# Patient Record
Sex: MALE | Race: OTHER | NOT HISPANIC OR LATINO
[De-identification: names, ages, dates, MRNs, and addresses within clinical notes are randomized per-mention and may not be internally consistent; named-entity substitution may affect disease eponyms.]

---

## 2021-04-13 PROBLEM — Z00.00 ENCOUNTER FOR PREVENTIVE HEALTH EXAMINATION: Status: ACTIVE | Noted: 2021-04-13

## 2021-04-15 ENCOUNTER — APPOINTMENT (OUTPATIENT)
Dept: SURGERY | Facility: CLINIC | Age: 61
End: 2021-04-15
Payer: MEDICAID

## 2021-04-15 VITALS
HEIGHT: 64 IN | BODY MASS INDEX: 29.53 KG/M2 | WEIGHT: 173 LBS | SYSTOLIC BLOOD PRESSURE: 106 MMHG | DIASTOLIC BLOOD PRESSURE: 59 MMHG | HEART RATE: 83 BPM

## 2021-04-15 DIAGNOSIS — Z56.0 UNEMPLOYMENT, UNSPECIFIED: ICD-10-CM

## 2021-04-15 DIAGNOSIS — Z78.9 OTHER SPECIFIED HEALTH STATUS: ICD-10-CM

## 2021-04-15 PROCEDURE — 99203 OFFICE O/P NEW LOW 30 MIN: CPT

## 2021-04-15 PROCEDURE — 99072 ADDL SUPL MATRL&STAF TM PHE: CPT

## 2021-04-15 RX ORDER — ASPIRIN 81 MG
81 TABLET, DELAYED RELEASE (ENTERIC COATED) ORAL
Refills: 0 | Status: ACTIVE | COMMUNITY

## 2021-04-15 SDOH — ECONOMIC STABILITY - INCOME SECURITY: UNEMPLOYMENT, UNSPECIFIED: Z56.0

## 2021-04-15 NOTE — PHYSICAL EXAM
[Alert] : alert [Oriented to Person] : oriented to person [Oriented to Place] : oriented to place [Oriented to Time] : oriented to time [Calm] : calm [JVD] : no jugular venous distention  [de-identified] : 5 cm thyroid right pole mass mobile, Firm, Smooth, non-Tender,   Well defined.  . No palpable lymph nodes.

## 2021-04-15 NOTE — DATA REVIEWED
[FreeTextEntry1] : TISH JOHN\par Date of Birth\par 1960\par Procedure\par CT NECK w/o contrast\par Study Date & Time\par 2021-03-25 12:06 PM\par Patient ID\par [id number]\par Accession Number\par [id number]\par Referring Physician\par MAXX SNYDER\par Institution Name\par [facility]\par Report\par RADIOLOGY REPORT\par FINDINGS\par FINDING\par Clinical indication: nontoxic goiter\par Technique: Multiple CT axial images were obtained without intravenous contrast from skull base to\par the thoracic inlet. Coronal and sagittal reformats were also obtained. This CT exam was performed\par using one or more of the following dose reduction techniques: Automated exposure control, adjustment\par of the mA and/or kV according to patient size, or use of iterative reconstruction technique.\par Comparison: None.\par Findings:\par Surface Markers: Right thyroid\par Skull Base / Nasopharynx: Unremarkable.\par Prevertebral / Retropharyngeal Space: Unremarkable.\par Floor of Mouth / Oral Cavity: Unremarkable.\par Tongue Base / Oropharynx / Hypopharynx: Unremarkable.\par Larynx: Unremarkable.\par Parotid / Submandibular Glands: Unremarkable.\par Lymph Nodes: No evidence for cervical lymphadenopathy by CT size criteria.\par Thyroid Gland: 4.7 x 4.5 x 5.6 cm. Mixed component predominantly low attenuation nodule in the\par right mid to lower pole abutting the isthmus. There is right to left tracheal shift without\par compression. There is no substernal extension.\par Paranasal Sinuses / Mastoids: Clear.\par Visualized Brain / Orbits: Unremarkable.\par Osseous Structures: Intact.\par  \par Visualized Lung Apices: No evidence for nodule or mass.\par IMPRESSION:\par 5.6 cm nodule of the right thyroid gland which is a candidate which may be a candidate for\par percutaneous biopsy/FNA. Diagnostic thyroid ultrasound is recommended for further classification.\par Electronically signed by: Nate Hammond MD 3/25/2021 1:37 PM\par Workstation: [id number]\par Electronically Signed By: Nate Hammond MD\par Sign Date: 25-MAR-21[facility]

## 2021-04-15 NOTE — PLAN
[FreeTextEntry1] : Mr. JOHN  was told significance of findings, options, risks and benefits were explained.    All surgical options were discussed including non-surgical treatments.  he was advised to obtain the results ogf the biopsy and consult Dr Acevedo. \par Patient advised to seek immediate medical attention with any acute change in symptoms or with the development of any new or worsening symptoms.  Patient's questions and concerns addressed to patient's satisfaction, and patient verbalized an understanding of the information discussed.\par \par

## 2021-04-15 NOTE — HISTORY OF PRESENT ILLNESS
[de-identified] : This is a 61 year  old patient who was referred by Dr. Martin Zamarripa with the chief complaint of having thyroid mass.  He reports having this condition for 6 years. He denies any trauma to the area.   He denies any fever or  night sweats. Appetite is good and weight is stable.  He had CT of the neck on 03/25/2021 that showed 4.7 x 4.5 x 5.6 cm. Mixed component predominantly low attenuation nodule in the right mid to lower pole abutting the isthmus. There is right to left tracheal shift without compression.  he denies difficulty breathing or swallowing.    He states  he had a Biopsy done in the clinic, he does not have the results. His PMD does not have the results.  He wants to know if it should be surgically  removed.\par \par

## 2021-04-15 NOTE — CONSULT LETTER
[Dear  ___] : Dear  [unfilled], [Consult Letter:] : I had the pleasure of evaluating your patient, [unfilled]. [Please see my note below.] : Please see my note below. [Consult Closing:] : Thank you very much for allowing me to participate in the care of this patient.  If you have any questions, please do not hesitate to contact me. [Sincerely,] : Sincerely, [FreeTextEntry3] : Douglas Ferguson MD, FACS

## 2021-04-20 ENCOUNTER — APPOINTMENT (OUTPATIENT)
Dept: SURGICAL ONCOLOGY | Facility: CLINIC | Age: 61
End: 2021-04-20
Payer: MEDICAID

## 2021-04-20 VITALS
WEIGHT: 173 LBS | TEMPERATURE: 98 F | SYSTOLIC BLOOD PRESSURE: 120 MMHG | HEIGHT: 64 IN | HEART RATE: 87 BPM | DIASTOLIC BLOOD PRESSURE: 72 MMHG | OXYGEN SATURATION: 98 % | BODY MASS INDEX: 29.53 KG/M2

## 2021-04-20 PROCEDURE — 99205 OFFICE O/P NEW HI 60 MIN: CPT

## 2021-04-20 PROCEDURE — 99072 ADDL SUPL MATRL&STAF TM PHE: CPT

## 2021-04-20 NOTE — PHYSICAL EXAM
[Normal Neck Lymph Nodes] : normal neck lymph nodes  [Normal Supraclavicular Lymph Nodes] : normal supraclavicular lymph nodes [Normal] : oriented to person, place and time, with appropriate affect [de-identified] : 5 cm mass right lobe of thyroid with tracheal deviaktion; no adenopathy

## 2021-04-20 NOTE — CONSULT LETTER
[Dear  ___] : Dear  [unfilled], [Consult Letter:] : I had the pleasure of evaluating your patient, [unfilled]. [Please see my note below.] : Please see my note below. [Consult Closing:] : Thank you very much for allowing me to participate in the care of this patient.  If you have any questions, please do not hesitate to contact me. [Sincerely,] : Sincerely, [FreeTextEntry2] : Douglas Ferguson MD\taina Zamarripa MD  [FreeTextEntry1] : i will keep you informed of the final pathology. [FreeTextEntry3] : Dean Acevedo MD FACS\par Chief of Surgical Oncology\par \par  [DrTommie  ___] : Dr. JASMINE

## 2021-04-20 NOTE — REASON FOR VISIT
[Initial Consultation] : an initial consultation for [Thyroid Nodule] : thyroid nodule [[employer] ] : provided by [employer]   [FreeTextEntry1] : [id number] [FreeTextEntry2] : Hermilo [TWNoteComboBox1] : Slovak

## 2021-04-20 NOTE — ASSESSMENT
[FreeTextEntry1] : IMP: \par 60 y/o male with 5.6 cm right thyroid mass with atypia\par \par PLAN: \par right thyroid lobectomy with frozen section; possible total thyroidectromy

## 2021-05-11 ENCOUNTER — OUTPATIENT (OUTPATIENT)
Dept: OUTPATIENT SERVICES | Facility: HOSPITAL | Age: 61
LOS: 1 days | End: 2021-05-11
Payer: MEDICAID

## 2021-05-11 ENCOUNTER — RESULT REVIEW (OUTPATIENT)
Age: 61
End: 2021-05-11

## 2021-05-11 VITALS
SYSTOLIC BLOOD PRESSURE: 121 MMHG | DIASTOLIC BLOOD PRESSURE: 71 MMHG | WEIGHT: 169.98 LBS | HEART RATE: 68 BPM | OXYGEN SATURATION: 98 % | RESPIRATION RATE: 16 BRPM | HEIGHT: 64 IN | TEMPERATURE: 98 F

## 2021-05-11 DIAGNOSIS — Z98.890 OTHER SPECIFIED POSTPROCEDURAL STATES: Chronic | ICD-10-CM

## 2021-05-11 DIAGNOSIS — Z01.818 ENCOUNTER FOR OTHER PREPROCEDURAL EXAMINATION: ICD-10-CM

## 2021-05-11 DIAGNOSIS — Z91.89 OTHER SPECIFIED PERSONAL RISK FACTORS, NOT ELSEWHERE CLASSIFIED: ICD-10-CM

## 2021-05-11 DIAGNOSIS — E07.9 DISORDER OF THYROID, UNSPECIFIED: ICD-10-CM

## 2021-05-11 DIAGNOSIS — Z95.2 PRESENCE OF PROSTHETIC HEART VALVE: Chronic | ICD-10-CM

## 2021-05-11 LAB
ANION GAP SERPL CALC-SCNC: 6 MMOL/L — SIGNIFICANT CHANGE UP (ref 5–17)
BLD GP AB SCN SERPL QL: SIGNIFICANT CHANGE UP
BUN SERPL-MCNC: 12 MG/DL — SIGNIFICANT CHANGE UP (ref 7–18)
CALCIUM SERPL-MCNC: 8.7 MG/DL — SIGNIFICANT CHANGE UP (ref 8.4–10.5)
CHLORIDE SERPL-SCNC: 104 MMOL/L — SIGNIFICANT CHANGE UP (ref 96–108)
CO2 SERPL-SCNC: 27 MMOL/L — SIGNIFICANT CHANGE UP (ref 22–31)
CREAT SERPL-MCNC: 0.64 MG/DL — SIGNIFICANT CHANGE UP (ref 0.5–1.3)
GLUCOSE SERPL-MCNC: 110 MG/DL — HIGH (ref 70–99)
HCT VFR BLD CALC: 42.9 % — SIGNIFICANT CHANGE UP (ref 39–50)
HGB BLD-MCNC: 14.1 G/DL — SIGNIFICANT CHANGE UP (ref 13–17)
INR BLD: 1.19 RATIO — HIGH (ref 0.88–1.16)
MCHC RBC-ENTMCNC: 29.3 PG — SIGNIFICANT CHANGE UP (ref 27–34)
MCHC RBC-ENTMCNC: 32.9 GM/DL — SIGNIFICANT CHANGE UP (ref 32–36)
MCV RBC AUTO: 89.2 FL — SIGNIFICANT CHANGE UP (ref 80–100)
NRBC # BLD: 0 /100 WBCS — SIGNIFICANT CHANGE UP (ref 0–0)
PLATELET # BLD AUTO: 192 K/UL — SIGNIFICANT CHANGE UP (ref 150–400)
POTASSIUM SERPL-MCNC: 4.1 MMOL/L — SIGNIFICANT CHANGE UP (ref 3.5–5.3)
POTASSIUM SERPL-SCNC: 4.1 MMOL/L — SIGNIFICANT CHANGE UP (ref 3.5–5.3)
PROTHROM AB SERPL-ACNC: 14 SEC — HIGH (ref 10.6–13.6)
RBC # BLD: 4.81 M/UL — SIGNIFICANT CHANGE UP (ref 4.2–5.8)
RBC # FLD: 13.8 % — SIGNIFICANT CHANGE UP (ref 10.3–14.5)
SODIUM SERPL-SCNC: 137 MMOL/L — SIGNIFICANT CHANGE UP (ref 135–145)
TSH SERPL-MCNC: 3.21 UU/ML — SIGNIFICANT CHANGE UP (ref 0.34–4.82)
WBC # BLD: 5.83 K/UL — SIGNIFICANT CHANGE UP (ref 3.8–10.5)
WBC # FLD AUTO: 5.83 K/UL — SIGNIFICANT CHANGE UP (ref 3.8–10.5)

## 2021-05-11 PROCEDURE — 71046 X-RAY EXAM CHEST 2 VIEWS: CPT

## 2021-05-11 PROCEDURE — 71046 X-RAY EXAM CHEST 2 VIEWS: CPT | Mod: 26

## 2021-05-11 PROCEDURE — G0463: CPT

## 2021-05-11 NOTE — H&P PST ADULT - NSICDXFAMILYHX_GEN_ALL_CORE_FT
FAMILY HISTORY:  Mother  Still living? Yes, Estimated age: Age Unknown  FH: hyperlipidemia, Age at diagnosis: Age Unknown

## 2021-05-11 NOTE — H&P PST ADULT - NSICDXPASTMEDICALHX_GEN_ALL_CORE_FT
PAST MEDICAL HISTORY:  Congenital heart disease involving aortic valve    History of BPH     HLD (hyperlipidemia)     Thyroid disorder

## 2021-05-11 NOTE — H&P PST ADULT - NSICDXPROBLEM_GEN_ALL_CORE_FT
PROBLEM DIAGNOSES  Problem: Disorder of thyroid, unspecified  Assessment and Plan: Scheduled for right thyroid lobectomy possible total 2021. Instructed to take levothyroixine with a sip of water the morning of surgery. Preoperative instructions discussed and given to patient. Covid test scheduled 72 hrs prior to surgery. Urged to keep appointment. Discussed pre-procedural skin preparation using chlorhexidine gluconate 4% solution the morning of surgery prior to coming to hospital. Verbal and written instructions given to patient. Patient verbalized understanding of instructions and is in agreement with the plan of care      Problem: At risk for injury  Assessment and Plan: AGE RELATED RISK FACTORS                                                       MOBILITY RELATED FACTORS  [x ] Age 41-60 years                                            (1 Point)                  [ ] Bed rest                                                        (1 Point)  [ ] Age: 61-74 years                                           (2 Points)                 [ ] Plaster cast                                                   (2 Points)  [ ] Age= 75 years                                              (3 Points)                 [ ] Bed bound for more than 72 hours                 (2 Points)  DISEASE RELATED RISK FACTORS                                               GENDER SPECIFIC FACTORS  [ ] Edema in the lower extremities                       (1 Point)                  [ ] Pregnancy                                                     (1 Point)  [x ] Varicose veins                                               (1 Point)                  [ ] Post-partum < 6 weeks                                   (1 Point)             x ] BMI > 25 Kg/m2                                            (1 Point)                  [ ] Hormonal therapy  or oral contraception          (1 Point)                 [ ] Sepsis (in the previous month)                        (1 Point)                  [ ] History of pregnancy complications                 (1 point)  [ ] Pneumonia or serious lung disease                                               [ ] Unexplained or recurrent                     (1 Point)           (in the previous month)                               (1 Point)  [ ] Abnormal pulmonary function test                     (1 Point)                 SURGERY RELATED RISK FACTORS  [ ] Acute myocardial infarction                              (1 Point)                 [ ]  Section                                             (1 Point)  [ ] Congestive heart failure (in the previous month)  (1 Point)               [ ] Minor surgery                                                  (1 Point)   [ ] Inflammatory bowel disease                             (1 Point)                 [ ] Arthroscopic surgery                                        (2 Points)  [ ] Central venous access                                      (2 Points)                [x ] General surgery lasting more than 45 minutes   (2 Points)       [ ] Stroke (in the previous month)                          (5 Points)               [ ] Elective arthroplasty                                         (5 Points)                                                                                                                                               HEMATOLOGY RELATED FACTORS                                                 TRAUMA RELATED RISK FACTORS  [ ] Prior episodes of VTE                                     (3 Points)                [ ] Fracture of the hip, pelvis, or leg                       (5 Points)  [ ] Positive family history for VTE                         (3 Points)                 [ ] Acute spinal cord injury (in the previous month)  (5 Points)  [ ] Prothrombin 70966 A                                     (3 Points)                 [ ] Paralysis  (less than 1 month)                             (5 Points)  [ ] Factor V Leiden                                             (3 Points)                  [ ] Multiple Trauma within 1 month                        (5 Points)  [ ] Lupus anticoagulants                                     (3 Points)                                                           [ ] Anticardiolipin antibodies                               (3 Points)                                                       [ ] High homocysteine in the blood                      (3 Points)                                             [ ] Other congenital or acquired thrombophilia      (3 Points)                                                [ ] Heparin induced thrombocytopenia                  (3 Points)                                      Total Score [      5    ]  Caprini Score 0 - 2:  Low Risk, No VTE Prophylaxis required for most patients, encourage ambulation       PROBLEM DIAGNOSES  Problem: Disorder of thyroid, unspecified  Assessment and Plan: Scheduled for right thyroid lobectomy possible total 2021. Instructed to take levothyroxine with a sip of water the morning of surgery. Preoperative instructions discussed and given to patient. Covid test scheduled 72 hrs prior to surgery. Urged to keep appointment. Discussed pre-procedural skin preparation using chlorhexidine gluconate 4% solution the morning of surgery prior to coming to hospital. Verbal and written instructions given to patient. Patient verbalized understanding of instructions and is in agreement with the plan of care      Problem: At risk for injury  Assessment and Plan: AGE RELATED RISK FACTORS                                                       MOBILITY RELATED FACTORS  [x ] Age 41-60 years                                            (1 Point)                  [ ] Bed rest                                                        (1 Point)  [ ] Age: 61-74 years                                           (2 Points)                 [ ] Plaster cast                                                   (2 Points)  [ ] Age= 75 years                                              (3 Points)                 [ ] Bed bound for more than 72 hours                 (2 Points)  DISEASE RELATED RISK FACTORS                                               GENDER SPECIFIC FACTORS  [ ] Edema in the lower extremities                       (1 Point)                  [ ] Pregnancy                                                     (1 Point)  [x ] Varicose veins                                               (1 Point)                  [ ] Post-partum < 6 weeks                                   (1 Point)             x ] BMI > 25 Kg/m2                                            (1 Point)                  [ ] Hormonal therapy  or oral contraception          (1 Point)                 [ ] Sepsis (in the previous month)                        (1 Point)                  [ ] History of pregnancy complications                 (1 point)  [ ] Pneumonia or serious lung disease                                               [ ] Unexplained or recurrent                     (1 Point)           (in the previous month)                               (1 Point)  [ ] Abnormal pulmonary function test                     (1 Point)                 SURGERY RELATED RISK FACTORS  [ ] Acute myocardial infarction                              (1 Point)                 [ ]  Section                                             (1 Point)  [ ] Congestive heart failure (in the previous month)  (1 Point)               [ ] Minor surgery                                                  (1 Point)   [ ] Inflammatory bowel disease                             (1 Point)                 [ ] Arthroscopic surgery                                        (2 Points)  [ ] Central venous access                                      (2 Points)                [x ] General surgery lasting more than 45 minutes   (2 Points)       [ ] Stroke (in the previous month)                          (5 Points)               [ ] Elective arthroplasty                                         (5 Points)                                                                                                                                               HEMATOLOGY RELATED FACTORS                                                 TRAUMA RELATED RISK FACTORS  [ ] Prior episodes of VTE                                     (3 Points)                [ ] Fracture of the hip, pelvis, or leg                       (5 Points)  [ ] Positive family history for VTE                         (3 Points)                 [ ] Acute spinal cord injury (in the previous month)  (5 Points)  [ ] Prothrombin 84712 A                                     (3 Points)                 [ ] Paralysis  (less than 1 month)                             (5 Points)  [ ] Factor V Leiden                                             (3 Points)                  [ ] Multiple Trauma within 1 month                        (5 Points)  [ ] Lupus anticoagulants                                     (3 Points)                                                           [ ] Anticardiolipin antibodies                               (3 Points)                                                       [ ] High homocysteine in the blood                      (3 Points)                                             [ ] Other congenital or acquired thrombophilia      (3 Points)                                                [ ] Heparin induced thrombocytopenia                  (3 Points)                                      Total Score [      5    ]  Caprini Score 0 - 2:  Low Risk, No VTE Prophylaxis required for most patients, encourage ambulation

## 2021-05-11 NOTE — H&P PST ADULT - ASSESSMENT
62 y/o male with PMH of BPH w/o LUTS, HLD, hypothyroidism, congenital heart disease (s/p aortic valve replacement 6/2018), is diagnosed with disorder of thyroid     60 y/o male with PMH of BPH w/o LUTS, HLD, hypothyroidism, congenital heart disease (s/p aortic pig valve replacement 6/2018), is diagnosed with disorder of thyroid

## 2021-05-11 NOTE — H&P PST ADULT - NS MD HP INPLANTS MED DEV
None dental implant, right rotator cuff, aortic pig valve, left ankle with metal implant/Heart valve

## 2021-05-11 NOTE — H&P PST ADULT - CARDIOVASCULAR COMMENTS
h/o HLD, congenital heart disease w/ history of aortic valve replacement 6/2018 h/o HLD, congenital heart disease w/ history of aortic pig valve replacement 6/2018

## 2021-05-11 NOTE — H&P PST ADULT - PRIMARY CARE PROVIDER
Dr. Martin Ceja Dr. Martin Ceja  Telephone [phone] Cardiologist Dr. Pace Telephone [phone]

## 2021-05-11 NOTE — H&P PST ADULT - NSICDXPASTSURGICALHX_GEN_ALL_CORE_FT
PAST SURGICAL HISTORY:  H/O aortic valve replacement 6/2018    H/O arthroscopy right shoulder 11/2020    History of ankle surgery left ankle - 1991 (hardware implanted)

## 2021-05-11 NOTE — H&P PST ADULT - HISTORY OF PRESENT ILLNESS
62 y/o male with PMH of     is diagnosed with disorder of thyroid. She is scheduled for right thyroid lobectomy possible total 5/24/2021 60 y/o male with PMH of BPH w/o LUTS, HLD, hypothyroidism, congenital heart disease (s/p aortic valve replacement 6/2018), is diagnosed with disorder of thyroid. He is scheduled for right thyroid lobectomy possible total 5/24/2021

## 2021-05-17 ENCOUNTER — RESULT REVIEW (OUTPATIENT)
Age: 61
End: 2021-05-17

## 2021-05-18 ENCOUNTER — OUTPATIENT (OUTPATIENT)
Dept: OUTPATIENT SERVICES | Facility: HOSPITAL | Age: 61
LOS: 1 days | End: 2021-05-18

## 2021-05-18 DIAGNOSIS — Z98.890 OTHER SPECIFIED POSTPROCEDURAL STATES: Chronic | ICD-10-CM

## 2021-05-18 DIAGNOSIS — Z95.2 PRESENCE OF PROSTHETIC HEART VALVE: Chronic | ICD-10-CM

## 2021-05-18 DIAGNOSIS — E07.9 DISORDER OF THYROID, UNSPECIFIED: ICD-10-CM

## 2021-05-18 PROBLEM — Q24.9 CONGENITAL MALFORMATION OF HEART, UNSPECIFIED: Chronic | Status: ACTIVE | Noted: 2021-05-11

## 2021-05-18 PROBLEM — E78.5 HYPERLIPIDEMIA, UNSPECIFIED: Chronic | Status: ACTIVE | Noted: 2021-05-11

## 2021-05-18 PROBLEM — Z87.438 PERSONAL HISTORY OF OTHER DISEASES OF MALE GENITAL ORGANS: Chronic | Status: ACTIVE | Noted: 2021-05-11

## 2021-05-19 LAB — SURGICAL PATHOLOGY STUDY: SIGNIFICANT CHANGE UP

## 2021-05-20 DIAGNOSIS — Z01.818 ENCOUNTER FOR OTHER PREPROCEDURAL EXAMINATION: ICD-10-CM

## 2021-05-21 ENCOUNTER — APPOINTMENT (OUTPATIENT)
Dept: DISASTER EMERGENCY | Facility: CLINIC | Age: 61
End: 2021-05-21

## 2021-05-22 LAB — SARS-COV-2 N GENE NPH QL NAA+PROBE: NOT DETECTED

## 2021-05-23 ENCOUNTER — TRANSCRIPTION ENCOUNTER (OUTPATIENT)
Age: 61
End: 2021-05-23

## 2021-05-24 ENCOUNTER — RESULT REVIEW (OUTPATIENT)
Age: 61
End: 2021-05-24

## 2021-05-24 ENCOUNTER — APPOINTMENT (OUTPATIENT)
Dept: SURGICAL ONCOLOGY | Facility: HOSPITAL | Age: 61
End: 2021-05-24

## 2021-05-24 ENCOUNTER — OUTPATIENT (OUTPATIENT)
Dept: OUTPATIENT SERVICES | Facility: HOSPITAL | Age: 61
LOS: 1 days | End: 2021-05-24
Payer: MEDICAID

## 2021-05-24 VITALS
RESPIRATION RATE: 15 BRPM | DIASTOLIC BLOOD PRESSURE: 61 MMHG | SYSTOLIC BLOOD PRESSURE: 118 MMHG | OXYGEN SATURATION: 98 % | HEART RATE: 65 BPM | TEMPERATURE: 98 F

## 2021-05-24 VITALS
HEART RATE: 58 BPM | WEIGHT: 169.98 LBS | TEMPERATURE: 98 F | OXYGEN SATURATION: 99 % | HEIGHT: 64 IN | RESPIRATION RATE: 16 BRPM | SYSTOLIC BLOOD PRESSURE: 117 MMHG | DIASTOLIC BLOOD PRESSURE: 45 MMHG

## 2021-05-24 DIAGNOSIS — E07.9 DISORDER OF THYROID, UNSPECIFIED: ICD-10-CM

## 2021-05-24 DIAGNOSIS — Z98.890 OTHER SPECIFIED POSTPROCEDURAL STATES: Chronic | ICD-10-CM

## 2021-05-24 DIAGNOSIS — Z95.2 PRESENCE OF PROSTHETIC HEART VALVE: Chronic | ICD-10-CM

## 2021-05-24 LAB — BLD GP AB SCN SERPL QL: SIGNIFICANT CHANGE UP

## 2021-05-24 PROCEDURE — 88307 TISSUE EXAM BY PATHOLOGIST: CPT

## 2021-05-24 PROCEDURE — 36415 COLL VENOUS BLD VENIPUNCTURE: CPT

## 2021-05-24 PROCEDURE — 86901 BLOOD TYPING SEROLOGIC RH(D): CPT

## 2021-05-24 PROCEDURE — 60220 PARTIAL REMOVAL OF THYROID: CPT

## 2021-05-24 PROCEDURE — 86850 RBC ANTIBODY SCREEN: CPT

## 2021-05-24 PROCEDURE — 64716 REVISION OF CRANIAL NERVE: CPT

## 2021-05-24 PROCEDURE — 86900 BLOOD TYPING SEROLOGIC ABO: CPT

## 2021-05-24 PROCEDURE — 88307 TISSUE EXAM BY PATHOLOGIST: CPT | Mod: 26

## 2021-05-24 RX ORDER — ASPIRIN/CALCIUM CARB/MAGNESIUM 324 MG
1 TABLET ORAL
Qty: 0 | Refills: 0 | DISCHARGE

## 2021-05-24 RX ORDER — FENTANYL CITRATE 50 UG/ML
25 INJECTION INTRAVENOUS
Refills: 0 | Status: DISCONTINUED | OUTPATIENT
Start: 2021-05-24 | End: 2021-05-24

## 2021-05-24 RX ORDER — TAMSULOSIN HYDROCHLORIDE 0.4 MG/1
1 CAPSULE ORAL
Qty: 0 | Refills: 0 | DISCHARGE

## 2021-05-24 RX ORDER — FENTANYL CITRATE 50 UG/ML
50 INJECTION INTRAVENOUS
Refills: 0 | Status: DISCONTINUED | OUTPATIENT
Start: 2021-05-24 | End: 2021-05-24

## 2021-05-24 RX ORDER — OXYCODONE AND ACETAMINOPHEN 5; 325 MG/1; MG/1
1 TABLET ORAL ONCE
Refills: 0 | Status: DISCONTINUED | OUTPATIENT
Start: 2021-05-24 | End: 2021-05-24

## 2021-05-24 RX ORDER — SODIUM CHLORIDE 9 MG/ML
3 INJECTION INTRAMUSCULAR; INTRAVENOUS; SUBCUTANEOUS EVERY 8 HOURS
Refills: 0 | Status: DISCONTINUED | OUTPATIENT
Start: 2021-05-24 | End: 2021-05-24

## 2021-05-24 RX ORDER — ATORVASTATIN CALCIUM 80 MG/1
1 TABLET, FILM COATED ORAL
Qty: 0 | Refills: 0 | DISCHARGE

## 2021-05-24 RX ORDER — LEVOTHYROXINE SODIUM 125 MCG
1 TABLET ORAL
Qty: 0 | Refills: 0 | DISCHARGE

## 2021-05-24 RX ADMIN — SODIUM CHLORIDE 3 MILLILITER(S): 9 INJECTION INTRAMUSCULAR; INTRAVENOUS; SUBCUTANEOUS at 06:59

## 2021-05-24 NOTE — ASU DISCHARGE PLAN (ADULT/PEDIATRIC) - CARE PROVIDER_API CALL
Dean Acevedo)  Surgery  [address], [place] [address]  Phone: [phone]  Fax: [phone]  Follow Up Time: 1 week

## 2021-05-24 NOTE — ASU DISCHARGE PLAN (ADULT/PEDIATRIC) - ASU DC SPECIAL INSTRUCTIONSFT
You had your right lobe of your thyroid removed. There is a dressing over your surgical site, keep the dressing in place for 48 hours. After you remove the dressing there will be Steri-strips underneath, do not remove as they are too fall off on their own. For pain take over the counter Tylenol, if not controlled by Tylenol take percocet as prescribed. You may shower, allow soap and water to rinse down surgical site, do not scrub the area. You may resume normal diet. After seen in clinic discussion will be had regarding restarting your Plavix.

## 2021-06-01 ENCOUNTER — APPOINTMENT (OUTPATIENT)
Dept: SURGICAL ONCOLOGY | Facility: CLINIC | Age: 61
End: 2021-06-01
Payer: MEDICAID

## 2021-06-01 VITALS
TEMPERATURE: 98.6 F | OXYGEN SATURATION: 98 % | SYSTOLIC BLOOD PRESSURE: 116 MMHG | HEIGHT: 64 IN | BODY MASS INDEX: 29.53 KG/M2 | HEART RATE: 55 BPM | WEIGHT: 173 LBS | DIASTOLIC BLOOD PRESSURE: 70 MMHG

## 2021-06-01 PROCEDURE — 99024 POSTOP FOLLOW-UP VISIT: CPT

## 2021-06-01 NOTE — HISTORY OF PRESENT ILLNESS
[de-identified] : Immanuel Borja is a 61 year old male who presents today an initial post operative visit. He is s/p right thyroid lobectomy 5/24/21. Final pathology is pending. Today, he denies pain,fever, chills, dysphagia, or hoarseness. He states he has swelling of his neck since surgery. \par \par Pertinent History:\par FNA Right Thyroid 8/13/20: Atypia of undetermined significance. Macrophages, Hurthle Cells, Red blood cells, and follicular cells with cytological and architectural atypia. Scant discernible colloid. \par \par FNA Right Thyroid 9/3/20: Benign, follicular cells and colloid. Consistent with hyperplastic nodule. \par \par CT Neck w/o contrast 3/25/21: 5.6 cm nodule of the right thyroid gland which is a candidate for percutaneous biopsy/FNA. Diagnostic thyroid ultrasound is recommended. \par \par He states he has had this right thyroid mass for approximately 6 years. There is right to left tracheal shift without compression. He denies difficulty breathing or swallowing at this time. He feels as though the mass causes him to cough at times if he has been speaking a lot. He states his last thyroid ultrasound was at least 2 years ago. \par \par His past medical history includes hypothyroidism and aortic valve insufficiency. His past surgical history includes previous open heart surgery for an aortic valve replacement in 2018. He takes Aspirin 81 mg daily.  He denies a person or family history of cancer. \par \par Referring MD: Douglas Ferguson MD\par PCP: Martin Zamarripa MD

## 2021-06-01 NOTE — ASSESSMENT
[FreeTextEntry1] : IMP: \par s/p right thyroid lobectomy 5/24/21. Final pathology is pending\par \par PLAN: \par f/u on path\par RTO 2 weeks

## 2021-06-01 NOTE — REASON FOR VISIT
[Post-Op] : a post-op for [[employer] ] : provided by [employer]   [FreeTextEntry1] : [id number] [FreeTextEntry2] : Jonathon Wood [TWNoteComboBox1] : Kuwaiti

## 2021-06-03 LAB — SURGICAL PATHOLOGY STUDY: SIGNIFICANT CHANGE UP

## 2021-06-15 ENCOUNTER — APPOINTMENT (OUTPATIENT)
Dept: SURGICAL ONCOLOGY | Facility: CLINIC | Age: 61
End: 2021-06-15
Payer: MEDICAID

## 2021-06-15 VITALS
BODY MASS INDEX: 29.88 KG/M2 | TEMPERATURE: 98.7 F | DIASTOLIC BLOOD PRESSURE: 61 MMHG | SYSTOLIC BLOOD PRESSURE: 105 MMHG | HEART RATE: 64 BPM | OXYGEN SATURATION: 99 % | WEIGHT: 175 LBS | HEIGHT: 64 IN

## 2021-06-15 PROCEDURE — 99024 POSTOP FOLLOW-UP VISIT: CPT

## 2021-06-15 NOTE — CONSULT LETTER
[Dear  ___] : Dear  [unfilled], [Courtesy Letter:] : I had the pleasure of seeing your patient, [unfilled], in my office today. [Please see my note below.] : Please see my note below. [Consult Closing:] : Thank you very much for allowing me to participate in the care of this patient.  If you have any questions, please do not hesitate to contact me. [Sincerely,] : Sincerely, [FreeTextEntry1] : He is doing well. [FreeTextEntry3] : Dean Acevedo MD FACS\par Chief of Surgical Oncology\par \par  [DrTommie  ___] : Dr. JASMINE

## 2021-06-15 NOTE — ASSESSMENT
[FreeTextEntry1] : IMP: \par s/p right thyroid lobectomy 5/24/21. Final pathology revealed right lobe thyroid- follicular lesion, favor follicular adenoma. \par \par PLAN: \par RTO 3 months\par no need for thyroid replacement

## 2021-06-15 NOTE — HISTORY OF PRESENT ILLNESS
[de-identified] : Immanuel Borja is a 61 year old male who presents today for a post operative visit. He is s/p right thyroid lobectomy 5/24/21. Final pathology revealed right lobe thyroid- follicular lesion, favor follicular adenoma. Today, he denies pain,fever, chills, dysphagia, or hoarseness. He states he has swelling of his neck since surgery. \par \par Pertinent History:\par FNA Right Thyroid 8/13/20: Atypia of undetermined significance. Macrophages, Hurthle Cells, Red blood cells, and follicular cells with cytological and architectural atypia. Scant discernible colloid. \par \par FNA Right Thyroid 9/3/20: Benign, follicular cells and colloid. Consistent with hyperplastic nodule. \par \par CT Neck w/o contrast 3/25/21: 5.6 cm nodule of the right thyroid gland which is a candidate for percutaneous biopsy/FNA. Diagnostic thyroid ultrasound is recommended. \par \par He states he has had this right thyroid mass for approximately 6 years. There is right to left tracheal shift without compression. He denies difficulty breathing or swallowing at this time. He feels as though the mass causes him to cough at times if he has been speaking a lot. He states his last thyroid ultrasound was at least 2 years ago. \par \par His past medical history includes hypothyroidism and aortic valve insufficiency. His past surgical history includes previous open heart surgery for an aortic valve replacement in 2018. He takes Aspirin 81 mg daily.  He denies a person or family history of cancer. \par \par Referring MD: Douglas Ferguson MD\par PCP: Martin Zamarripa MD

## 2021-06-15 NOTE — REASON FOR VISIT
[Post-Op] : a post-op for [[employer] ] : provided by [employer]   [FreeTextEntry1] : [id number] [FreeTextEntry2] : Jonathon Wood [TWNoteComboBox1] : Micronesian

## 2021-09-13 PROBLEM — E07.9 THYROID DISORDER: Status: ACTIVE | Noted: 2021-04-15

## 2021-09-14 ENCOUNTER — APPOINTMENT (OUTPATIENT)
Dept: SURGICAL ONCOLOGY | Facility: CLINIC | Age: 61
End: 2021-09-14
Payer: MEDICAID

## 2021-09-14 VITALS
HEART RATE: 67 BPM | DIASTOLIC BLOOD PRESSURE: 57 MMHG | SYSTOLIC BLOOD PRESSURE: 111 MMHG | HEIGHT: 64 IN | WEIGHT: 175 LBS | BODY MASS INDEX: 29.88 KG/M2 | OXYGEN SATURATION: 99 %

## 2021-09-14 VITALS — TEMPERATURE: 96.4 F

## 2021-09-14 DIAGNOSIS — E07.9 DISORDER OF THYROID, UNSPECIFIED: ICD-10-CM

## 2021-09-14 PROCEDURE — T1013: CPT

## 2021-09-14 PROCEDURE — 99214 OFFICE O/P EST MOD 30 MIN: CPT

## 2021-09-14 NOTE — ASSESSMENT
[FreeTextEntry1] : IMP: \par s/p right thyroid lobectomy 5/24/21. Final pathology revealed right lobe thyroid- follicular lesion, favor follicular adenoma. \par \par PLAN: \par RTO PRN\par

## 2021-09-14 NOTE — CONSULT LETTER
[Dear  ___] : Dear  [unfilled], [Courtesy Letter:] : I had the pleasure of seeing your patient, [unfilled], in my office today. [Please see my note below.] : Please see my note below. [Consult Closing:] : Thank you very much for allowing me to participate in the care of this patient.  If you have any questions, please do not hesitate to contact me. [Sincerely,] : Sincerely, [DrTommie  ___] : Dr. JASMINE [FreeTextEntry3] : Dean Aceveod MD FACS\par Chief of Surgical Oncology\par \par

## 2021-09-14 NOTE — PHYSICAL EXAM
[Normal Neck Lymph Nodes] : normal neck lymph nodes  [Normal Supraclavicular Lymph Nodes] : normal supraclavicular lymph nodes [Normal] : oriented to person, place and time, with appropriate affect [de-identified] : incision clear; no masses or adenopathy

## 2021-09-14 NOTE — REASON FOR VISIT
[Follow-Up Visit] : a follow-up visit for [Time Spent: ____ minutes] : Total time spent using  services: [unfilled] minutes. The patient's primary language is not English thus required  services. [Interpreters_IDNumber] : [id number] [Interpreters_FullName] : Lopez [TWNoteComboBox1] : [place]ian

## 2021-09-14 NOTE — HISTORY OF PRESENT ILLNESS
[de-identified] : Immanuel Borja is a 61 year old male who presents today for a follow up visit. He is s/p right thyroid lobectomy 5/24/21. Final pathology revealed right lobe thyroid- follicular lesion, favor follicular adenoma. He denies dyspnea, dysphagia, hoarseness, constitutional symptoms or recurrent neck mass.\par \par \par Pertinent History:\par FNA Right Thyroid 8/13/20: Atypia of undetermined significance. Macrophages, Hurthle Cells, Red blood cells, and follicular cells with cytological and architectural atypia. Scant discernible colloid. \par \par FNA Right Thyroid 9/3/20: Benign, follicular cells and colloid. Consistent with hyperplastic nodule. \par \par CT Neck w/o contrast 3/25/21: 5.6 cm nodule of the right thyroid gland which is a candidate for percutaneous biopsy/FNA. Diagnostic thyroid ultrasound is recommended. \par \par He states he has had this right thyroid mass for approximately 6 years. There is right to left tracheal shift without compression. He denies difficulty breathing or swallowing at this time. He feels as though the mass causes him to cough at times if he has been speaking a lot. He states his last thyroid ultrasound was at least 2 years ago. \par \par His past medical history includes hypothyroidism and aortic valve insufficiency. His past surgical history includes previous open heart surgery for an aortic valve replacement in 2018. He takes Aspirin 81 mg daily.  He denies a person or family history of cancer. \par \par Referring MD: Douglas Ferguson MD\par PCP: Martin Zamarripa MD
